# Patient Record
Sex: MALE | URBAN - METROPOLITAN AREA
[De-identification: names, ages, dates, MRNs, and addresses within clinical notes are randomized per-mention and may not be internally consistent; named-entity substitution may affect disease eponyms.]

---

## 2022-12-01 ENCOUNTER — OFFICE VISIT (OUTPATIENT)
Dept: URBAN - METROPOLITAN AREA SURGERY CENTER 31 | Facility: SURGERY CENTER | Age: 52
End: 2022-12-01

## 2022-12-12 ENCOUNTER — CLAIMS CREATED FROM THE CLAIM WINDOW (OUTPATIENT)
Dept: URBAN - METROPOLITAN AREA CLINIC 4 | Facility: CLINIC | Age: 52
End: 2022-12-12
Payer: COMMERCIAL

## 2022-12-12 ENCOUNTER — OFFICE VISIT (OUTPATIENT)
Dept: URBAN - METROPOLITAN AREA SURGERY CENTER 31 | Facility: SURGERY CENTER | Age: 52
End: 2022-12-12

## 2022-12-12 ENCOUNTER — CLAIMS CREATED FROM THE CLAIM WINDOW (OUTPATIENT)
Dept: URBAN - METROPOLITAN AREA SURGERY CENTER 31 | Facility: SURGERY CENTER | Age: 52
End: 2022-12-12
Payer: COMMERCIAL

## 2022-12-12 DIAGNOSIS — Z12.11 COLON CANCER SCREENING: ICD-10-CM

## 2022-12-12 DIAGNOSIS — K63.89 BACTERIAL OVERGROWTH SYNDROME: ICD-10-CM

## 2022-12-12 DIAGNOSIS — K62.3 RECTAL PROLAPSE: ICD-10-CM

## 2022-12-12 PROCEDURE — 45385 COLONOSCOPY W/LESION REMOVAL: CPT | Performed by: INTERNAL MEDICINE

## 2022-12-12 PROCEDURE — G8907 PT DOC NO EVENTS ON DISCHARG: HCPCS | Performed by: INTERNAL MEDICINE

## 2022-12-12 PROCEDURE — 88305 TISSUE EXAM BY PATHOLOGIST: CPT | Performed by: PATHOLOGY

## 2024-07-15 ENCOUNTER — OFFICE VISIT (OUTPATIENT)
Dept: URBAN - METROPOLITAN AREA CLINIC 128 | Facility: CLINIC | Age: 54
End: 2024-07-15
Payer: COMMERCIAL

## 2024-07-15 ENCOUNTER — DASHBOARD ENCOUNTERS (OUTPATIENT)
Age: 54
End: 2024-07-15

## 2024-07-15 VITALS
DIASTOLIC BLOOD PRESSURE: 72 MMHG | WEIGHT: 212.8 LBS | SYSTOLIC BLOOD PRESSURE: 124 MMHG | HEIGHT: 71 IN | HEART RATE: 90 BPM | BODY MASS INDEX: 29.79 KG/M2 | TEMPERATURE: 97.5 F

## 2024-07-15 DIAGNOSIS — K62.5 RECTAL BLEEDING: ICD-10-CM

## 2024-07-15 DIAGNOSIS — K64.8 EXTERNAL HEMORRHOIDS: ICD-10-CM

## 2024-07-15 PROCEDURE — 99204 OFFICE O/P NEW MOD 45 MIN: CPT | Performed by: PHYSICIAN ASSISTANT

## 2024-07-15 PROCEDURE — 99214 OFFICE O/P EST MOD 30 MIN: CPT | Performed by: PHYSICIAN ASSISTANT

## 2024-07-15 RX ORDER — HYDROCORTISONE 25 MG/G
1 APPLICATION CREAM TOPICAL TWICE A DAY
Qty: 1 TUBE | Refills: 0 | OUTPATIENT
Start: 2024-07-15 | End: 2024-08-14

## 2024-07-15 RX ORDER — LISINOPRIL 10 MG/1
1 TABLET TABLET ORAL ONCE A DAY
Status: ACTIVE | COMMUNITY

## 2024-07-15 RX ORDER — CELECOXIB 100 MG/1
1 CAPSULE WITH FOOD CAPSULE ORAL ONCE A DAY
Status: ACTIVE | COMMUNITY

## 2024-07-15 NOTE — PHYSICAL EXAM GASTROINTESTINAL
Abdomen , soft, nontender, nondistended , no guarding or rigidity , no masses palpable , normal bowel sounds , Liver and Spleen , no hepatomegaly present Rectal  , normal sphincter tone, no external hemorrhoids noted, a non-bleeding internal hemorrhoid was noted, no rectal masses or bleeding present. Rectal examination was performed with a chaperone present

## 2024-07-15 NOTE — HPI-OTHER HISTORIES
The patient presents today due to the following symptoms: bright red blood per rectum and constipation Duration of symptoms: x 2 weeks He denies melena Has 1 BM a day He went to [facility] ED on 7/10/24 for bright red blood per rectum in stool x 2 weeks. He had abdominal pain, wose after eating, no appetite and nausea with no vomiting. WBC 6.7, hgb/hct 15.9/46.3, platelets 198, normal LFTs. Had a colonoscopy in 2022 and a benign mucosal polyp was noted.   Abdominal and pelvic CT scan was negative for diverticulitis or other acute abnormality to explain his bleeding or pain. Patient denies a family history of colon polyps or colon cancer or stomach cancer  Patient denies any heart, lung, or kidney problems.  Patient denies any blood thinners or oxygen therapy. Denies any dialysis. Denies any pacemaker or defibrillator.

## 2024-07-19 ENCOUNTER — OFFICE VISIT (OUTPATIENT)
Dept: URBAN - METROPOLITAN AREA SURGERY CENTER 31 | Facility: SURGERY CENTER | Age: 54
End: 2024-07-19
Payer: COMMERCIAL

## 2024-07-19 ENCOUNTER — CLAIMS CREATED FROM THE CLAIM WINDOW (OUTPATIENT)
Dept: URBAN - METROPOLITAN AREA CLINIC 4 | Facility: CLINIC | Age: 54
End: 2024-07-19
Payer: COMMERCIAL

## 2024-07-19 DIAGNOSIS — K92.1 HEMATOCHEZIA: ICD-10-CM

## 2024-07-19 DIAGNOSIS — K51.818 OTHER ULCERATIVE COLITIS WITH OTHER COMPLICATION: ICD-10-CM

## 2024-07-19 DIAGNOSIS — K51.919 ULCERATIVE COLITIS, UNSPECIFIED WITH UNSPECIFIED COMPLICATIONS: ICD-10-CM

## 2024-07-19 DIAGNOSIS — K51.919 COLITIS, ULCERATIVE: ICD-10-CM

## 2024-07-19 PROCEDURE — 88305 TISSUE EXAM BY PATHOLOGIST: CPT | Performed by: PATHOLOGY

## 2024-07-19 PROCEDURE — 00811 ANES LWR INTST NDSC NOS: CPT | Performed by: NURSE ANESTHETIST, CERTIFIED REGISTERED

## 2024-07-19 PROCEDURE — 45380 COLONOSCOPY AND BIOPSY: CPT | Performed by: INTERNAL MEDICINE

## 2024-07-19 RX ORDER — MESALAMINE 4 G/60ML
PER RECTUM SUSPENSION RECTAL AT BEDTIME
Qty: 30 | Refills: 1 | OUTPATIENT
Start: 2024-07-19 | End: 2024-09-17

## 2024-07-19 RX ORDER — LISINOPRIL 10 MG/1
1 TABLET TABLET ORAL ONCE A DAY
Status: ACTIVE | COMMUNITY

## 2024-07-19 RX ORDER — CELECOXIB 100 MG/1
1 CAPSULE WITH FOOD CAPSULE ORAL ONCE A DAY
Status: ACTIVE | COMMUNITY

## 2024-07-19 RX ORDER — HYDROCORTISONE 25 MG/G
1 APPLICATION CREAM TOPICAL TWICE A DAY
Qty: 1 TUBE | Refills: 0 | Status: ACTIVE | COMMUNITY
Start: 2024-07-15 | End: 2024-08-14

## 2024-07-31 ENCOUNTER — WEB ENCOUNTER (OUTPATIENT)
Dept: URBAN - METROPOLITAN AREA CLINIC 128 | Facility: CLINIC | Age: 54
End: 2024-07-31

## 2024-08-14 ENCOUNTER — OFFICE VISIT (OUTPATIENT)
Dept: URBAN - METROPOLITAN AREA CLINIC 128 | Facility: CLINIC | Age: 54
End: 2024-08-14
Payer: COMMERCIAL

## 2024-08-14 VITALS
SYSTOLIC BLOOD PRESSURE: 124 MMHG | HEIGHT: 71 IN | TEMPERATURE: 97.5 F | DIASTOLIC BLOOD PRESSURE: 70 MMHG | WEIGHT: 211 LBS | HEART RATE: 80 BPM | BODY MASS INDEX: 29.54 KG/M2

## 2024-08-14 DIAGNOSIS — K51.80 OTHER ULCERATIVE COLITIS WITHOUT COMPLICATION: ICD-10-CM

## 2024-08-14 DIAGNOSIS — K62.5 RECTAL BLEEDING: ICD-10-CM

## 2024-08-14 PROBLEM — 64766004: Status: ACTIVE | Noted: 2024-08-14

## 2024-08-14 PROCEDURE — 99214 OFFICE O/P EST MOD 30 MIN: CPT | Performed by: PHYSICIAN ASSISTANT

## 2024-08-14 RX ORDER — CELECOXIB 100 MG/1
1 CAPSULE WITH FOOD CAPSULE ORAL ONCE A DAY
Status: ACTIVE | COMMUNITY

## 2024-08-14 RX ORDER — HYDROCORTISONE 25 MG/G
1 APPLICATION CREAM TOPICAL TWICE A DAY
Qty: 1 TUBE | Refills: 0 | Status: ACTIVE | COMMUNITY
Start: 2024-07-15 | End: 2024-08-14

## 2024-08-14 RX ORDER — BUDESONIDE 3 MG/1
3 CAPSULES CAPSULE, DELAYED RELEASE PELLETS ORAL ONCE A DAY
Qty: 168 CAPSULE | Refills: 0 | OUTPATIENT
Start: 2024-08-14

## 2024-08-14 RX ORDER — MESALAMINE 1.2 G/1
2 TABLETS WITH A MEAL TABLET, DELAYED RELEASE ORAL ONCE A DAY
Qty: 60 | Refills: 5 | OUTPATIENT
Start: 2024-08-14 | End: 2025-02-09

## 2024-08-14 RX ORDER — LISINOPRIL 10 MG/1
1 TABLET TABLET ORAL ONCE A DAY
Status: ACTIVE | COMMUNITY

## 2024-08-14 RX ORDER — MESALAMINE 4 G/60ML
PER RECTUM SUSPENSION RECTAL AT BEDTIME
Qty: 30 | Refills: 1 | Status: ACTIVE | COMMUNITY
Start: 2024-07-19 | End: 2024-09-17

## 2024-08-14 NOTE — HPI-OTHER HISTORIES
The patient presents today due to the following symptoms: bright red blood per rectum and rectal mucus which is persistent despite starting rowasa.  Duration of symptoms: x 2 weeks He denies melena Has 1 BM a day He went to [facility] ED on 7/10/24 for bright red blood per rectum in stool x 2 weeks. He had abdominal pain, wose after eating, no appetite and nausea with no vomiting. WBC 6.7, hgb/hct 15.9/46.3, platelets 198, normal LFTs. Had a colonoscopy in 2022 and a benign mucosal polyp was noted.   Abdominal and pelvic CT scan was negative for diverticulitis or other acute abnormality to explain his bleeding or pain. Patient denies a family history of colon polyps or colon cancer or stomach cancer  Patient denies any heart, lung, or kidney problems.  Patient denies any blood thinners or oxygen therapy. Denies any dialysis. Denies any pacemaker or defibrillator. 2024 colonoscopy revealed: Rectum, Biopsy:DIFFUSE CHRONIC ACTIVE COLITIS, CONSISTENT WITH ULCERATIVE COLITIS.  See            Comment.Negative for Infectious Organisms, Dysplasia or Malignancy

## 2024-08-14 NOTE — PHYSICAL EXAM GASTROINTESTINAL
Abdomen , soft, nontender, nondistended , no guarding or rigidity , no masses palpable , normal bowel sounds , Liver and Spleen , no hepatomegaly present Rectal  , deferred

## 2024-08-16 ENCOUNTER — TELEPHONE ENCOUNTER (OUTPATIENT)
Dept: URBAN - METROPOLITAN AREA CLINIC 128 | Facility: CLINIC | Age: 54
End: 2024-08-16

## 2024-08-30 LAB
A/G RATIO: 2.1
ALBUMIN: 4.4
ALKALINE PHOSPHATASE: 62
ALT (SGPT): 16
ANCA SCREEN: NEGATIVE
AST (SGOT): 15
BILIRUBIN, TOTAL: 1.2
BUN/CREATININE RATIO: (no result)
BUN: 16
C-REACTIVE PROTEIN, QUANT: <3
CALCIUM: 9.3
CARBON DIOXIDE, TOTAL: 21
CHLORIDE: 103
CREATININE: 1.05
EGFR: 85
FERRITIN, SERUM: 365
GASCA: 18.7
GLOBULIN, TOTAL: 2.1
GLUCOSE: 105
HEMATOCRIT: 50.6
HEMOGLOBIN: 16.6
IRON BIND.CAP.(TIBC): 352
IRON SATURATION: 31
IRON: 108
MCH: 31
MCHC: 32.8
MCV: 94.4
MITOGEN-NIL: 2.82
MPV: 10.3
MYELOPEROXIDASE ANTIBODY: <1
PLATELET COUNT: 212
POTASSIUM: 4
PROTEIN, TOTAL: 6.5
PROTEINASE-3 ANTIBODY: <1
QUANTIFERON NIL VALUE: 0.03
QUANTIFERON TB1 AG VALUE: 0
QUANTIFERON TB2 AG VALUE: 0
QUANTIFERON-TB GOLD PLUS: NEGATIVE
RDW: 12.3
RED BLOOD CELL COUNT: 5.36
SACCHAROMYCES CEREVISIAE AB (ASCA) (IGA): 5
SODIUM: 138
WHITE BLOOD CELL COUNT: 6.2

## 2024-10-21 ENCOUNTER — ERX REFILL RESPONSE (OUTPATIENT)
Dept: URBAN - METROPOLITAN AREA CLINIC 128 | Facility: CLINIC | Age: 54
End: 2024-10-21

## 2024-10-21 RX ORDER — BUDESONIDE 3 MG/1
TAKE THREE CAPSULES BY MOUTH ONE TIME DAILY CAPSULE, COATED PELLETS ORAL
Qty: 180 CAPSULE | Refills: 0 | OUTPATIENT

## 2024-10-21 RX ORDER — BUDESONIDE 3 MG/1
TAKE THREE CAPSULES BY MOUTH ONE TIME DAILY CAPSULE, COATED PELLETS ORAL
Qty: 180 CAPSULE | Refills: 1 | OUTPATIENT

## 2024-11-11 ENCOUNTER — OFFICE VISIT (OUTPATIENT)
Dept: URBAN - METROPOLITAN AREA CLINIC 128 | Facility: CLINIC | Age: 54
End: 2024-11-11
Payer: COMMERCIAL

## 2024-11-11 VITALS
SYSTOLIC BLOOD PRESSURE: 130 MMHG | TEMPERATURE: 97.3 F | DIASTOLIC BLOOD PRESSURE: 78 MMHG | BODY MASS INDEX: 29.79 KG/M2 | HEART RATE: 81 BPM | HEIGHT: 71 IN | WEIGHT: 212.8 LBS

## 2024-11-11 DIAGNOSIS — K62.5 RECTAL BLEEDING: ICD-10-CM

## 2024-11-11 DIAGNOSIS — K51.80 OTHER ULCERATIVE COLITIS WITHOUT COMPLICATION: ICD-10-CM

## 2024-11-11 PROCEDURE — 99213 OFFICE O/P EST LOW 20 MIN: CPT | Performed by: PHYSICIAN ASSISTANT

## 2024-11-11 RX ORDER — CELECOXIB 100 MG/1
1 CAPSULE WITH FOOD CAPSULE ORAL ONCE A DAY
Status: ACTIVE | COMMUNITY

## 2024-11-11 RX ORDER — BUDESONIDE 3 MG/1
3 CAPSULES CAPSULE, DELAYED RELEASE PELLETS ORAL ONCE A DAY
Qty: 168 CAPSULE | Refills: 0 | OUTPATIENT

## 2024-11-11 RX ORDER — MESALAMINE 1.2 G/1
2 TABLETS WITH A MEAL TABLET, DELAYED RELEASE ORAL ONCE A DAY
Qty: 60 | Refills: 5 | OUTPATIENT

## 2024-11-11 RX ORDER — MESALAMINE 1.2 G/1
2 TABLETS WITH A MEAL TABLET, DELAYED RELEASE ORAL ONCE A DAY
Qty: 60 | Refills: 5 | Status: ACTIVE | COMMUNITY
Start: 2024-08-14 | End: 2025-02-09

## 2024-11-11 RX ORDER — FENOFIBRATE 160 MG/1
1 TABLET TABLET ORAL ONCE A DAY
Status: ACTIVE | COMMUNITY

## 2024-11-11 RX ORDER — POLYETHYLENE GLYCOL 3350, SODIUM SULFATE ANHYDROUS, SODIUM BICARBONATE, SODIUM CHLORIDE, POTASSIUM CHLORIDE 236; 22.74; 6.74; 5.86; 2.97 G/4L; G/4L; G/4L; G/4L; G/4L
AS DIRECTED POWDER, FOR SOLUTION ORAL ONCE A DAY
Qty: 1 BOTTLE | Refills: 0 | OUTPATIENT
Start: 2024-11-11 | End: 2024-11-12

## 2024-11-11 RX ORDER — BUDESONIDE 3 MG/1
TAKE THREE CAPSULES BY MOUTH ONE TIME DAILY CAPSULE, COATED PELLETS ORAL
Qty: 180 CAPSULE | Refills: 0 | Status: ACTIVE | COMMUNITY

## 2024-11-11 RX ORDER — LISINOPRIL 10 MG/1
1 TABLET TABLET ORAL ONCE A DAY
Status: ACTIVE | COMMUNITY

## 2024-11-11 NOTE — HPI-OTHER HISTORIES
The patient presents today due to the following symptoms: bright red blood per rectum and rectal mucus have resolved now on lialda. He took budesonide for flare but if off of it now.  Duration of symptoms: x 2 weeks He denies melena Has 1 BM a day He went to [facility] ED on 7/10/24 for bright red blood per rectum in stool x 2 weeks. He had abdominal pain, wose after eating, no appetite and nausea with no vomiting. WBC 6.7, hgb/hct 15.9/46.3, platelets 198, normal LFTs. Had a colonoscopy in 2022 and a benign mucosal polyp was noted.   Abdominal and pelvic CT scan was negative for diverticulitis or other acute abnormality to explain his bleeding or pain. Patient denies a family history of colon polyps or colon cancer or stomach cancer  Patient denies any heart, lung, or kidney problems.  Patient denies any blood thinners or oxygen therapy. Denies any dialysis. Denies any pacemaker or defibrillator. 2024 colonoscopy revealed: Rectum, Biopsy:DIFFUSE CHRONIC ACTIVE COLITIS, CONSISTENT WITH ULCERATIVE COLITIS.  See            Comment.Negative for Infectious Organisms, Dysplasia or Malignancy

## 2024-11-12 ENCOUNTER — TELEPHONE ENCOUNTER (OUTPATIENT)
Dept: URBAN - METROPOLITAN AREA CLINIC 128 | Facility: CLINIC | Age: 54
End: 2024-11-12

## 2025-01-06 ENCOUNTER — OFFICE VISIT (OUTPATIENT)
Dept: URBAN - METROPOLITAN AREA SURGERY CENTER 31 | Facility: SURGERY CENTER | Age: 55
End: 2025-01-06

## 2025-02-03 ENCOUNTER — OFFICE VISIT (OUTPATIENT)
Dept: URBAN - METROPOLITAN AREA CLINIC 128 | Facility: CLINIC | Age: 55
End: 2025-02-03

## 2025-02-04 ENCOUNTER — WEB ENCOUNTER (OUTPATIENT)
Dept: URBAN - METROPOLITAN AREA CLINIC 128 | Facility: CLINIC | Age: 55
End: 2025-02-04

## 2025-02-06 ENCOUNTER — CLAIMS CREATED FROM THE CLAIM WINDOW (OUTPATIENT)
Dept: URBAN - METROPOLITAN AREA CLINIC 4 | Facility: CLINIC | Age: 55
End: 2025-02-06
Payer: COMMERCIAL

## 2025-02-06 ENCOUNTER — OFFICE VISIT (OUTPATIENT)
Dept: URBAN - METROPOLITAN AREA SURGERY CENTER 31 | Facility: SURGERY CENTER | Age: 55
End: 2025-02-06
Payer: COMMERCIAL

## 2025-02-06 DIAGNOSIS — K63.5 POLYP OF COLON: ICD-10-CM

## 2025-02-06 DIAGNOSIS — D12.3 BENIGN NEOPLASM OF TRANSVERSE COLON: ICD-10-CM

## 2025-02-06 DIAGNOSIS — K51.20 ULCERATIVE PROCTITIS WITHOUT COMPLICATION: ICD-10-CM

## 2025-02-06 DIAGNOSIS — K51.80 OTHER ULCERATIVE COLITIS WITHOUT COMPLICATIONS: ICD-10-CM

## 2025-02-06 DIAGNOSIS — K63.89 OTHER SPECIFIED DISEASES OF INTESTINE: ICD-10-CM

## 2025-02-06 DIAGNOSIS — K63.5 BENIGN COLON POLYP: ICD-10-CM

## 2025-02-06 DIAGNOSIS — D12.3 ADENOMA OF TRANSVERSE COLON: ICD-10-CM

## 2025-02-06 PROCEDURE — 00811 ANES LWR INTST NDSC NOS: CPT | Performed by: NURSE ANESTHETIST, CERTIFIED REGISTERED

## 2025-02-06 PROCEDURE — 45380 COLONOSCOPY AND BIOPSY: CPT | Performed by: INTERNAL MEDICINE

## 2025-02-06 PROCEDURE — 88305 TISSUE EXAM BY PATHOLOGIST: CPT | Performed by: PATHOLOGY

## 2025-02-06 PROCEDURE — 88300 SURGICAL PATH GROSS: CPT | Performed by: PATHOLOGY

## 2025-02-06 RX ORDER — LISINOPRIL 10 MG/1
1 TABLET TABLET ORAL ONCE A DAY
Status: ACTIVE | COMMUNITY

## 2025-02-06 RX ORDER — CELECOXIB 100 MG/1
1 CAPSULE WITH FOOD CAPSULE ORAL ONCE A DAY
Status: ACTIVE | COMMUNITY

## 2025-02-06 RX ORDER — BUDESONIDE 3 MG/1
3 CAPSULES CAPSULE, DELAYED RELEASE PELLETS ORAL ONCE A DAY
Qty: 168 CAPSULE | Refills: 0 | Status: ACTIVE | COMMUNITY

## 2025-02-06 RX ORDER — FENOFIBRATE 160 MG/1
1 TABLET TABLET ORAL ONCE A DAY
Status: ACTIVE | COMMUNITY

## 2025-02-06 RX ORDER — BUDESONIDE 3 MG/1
TAKE THREE CAPSULES BY MOUTH ONE TIME DAILY CAPSULE, COATED PELLETS ORAL
Qty: 180 CAPSULE | Refills: 0 | Status: ACTIVE | COMMUNITY

## 2025-02-06 RX ORDER — MESALAMINE 1.2 G/1
2 TABLETS WITH A MEAL TABLET, DELAYED RELEASE ORAL ONCE A DAY
Qty: 60 | Refills: 5 | Status: ACTIVE | COMMUNITY

## 2025-02-24 ENCOUNTER — OFFICE VISIT (OUTPATIENT)
Dept: URBAN - METROPOLITAN AREA CLINIC 128 | Facility: CLINIC | Age: 55
End: 2025-02-24
Payer: COMMERCIAL

## 2025-02-24 VITALS
DIASTOLIC BLOOD PRESSURE: 74 MMHG | WEIGHT: 221 LBS | SYSTOLIC BLOOD PRESSURE: 124 MMHG | TEMPERATURE: 97.5 F | HEART RATE: 84 BPM | BODY MASS INDEX: 30.94 KG/M2 | HEIGHT: 71 IN

## 2025-02-24 DIAGNOSIS — Z86.0100 PERSONAL HISTORY OF COLONIC POLYPS: ICD-10-CM

## 2025-02-24 DIAGNOSIS — K62.5 RECTAL BLEEDING: ICD-10-CM

## 2025-02-24 DIAGNOSIS — K51.80 OTHER ULCERATIVE COLITIS WITHOUT COMPLICATION: ICD-10-CM

## 2025-02-24 PROCEDURE — 99213 OFFICE O/P EST LOW 20 MIN: CPT | Performed by: PHYSICIAN ASSISTANT

## 2025-02-24 RX ORDER — BUDESONIDE 3 MG/1
3 CAPSULES CAPSULE, DELAYED RELEASE PELLETS ORAL ONCE A DAY
Qty: 168 CAPSULE | Refills: 0 | OUTPATIENT

## 2025-02-24 RX ORDER — LISINOPRIL 10 MG/1
1 TABLET TABLET ORAL ONCE A DAY
Status: ACTIVE | COMMUNITY

## 2025-02-24 RX ORDER — BUDESONIDE 3 MG/1
TAKE THREE CAPSULES BY MOUTH ONE TIME DAILY CAPSULE, COATED PELLETS ORAL
Qty: 180 CAPSULE | Refills: 0 | Status: ACTIVE | COMMUNITY

## 2025-02-24 RX ORDER — MESALAMINE 1.2 G/1
2 TABLETS WITH A MEAL TABLET, DELAYED RELEASE ORAL ONCE A DAY
Qty: 60 | Refills: 5 | Status: ACTIVE | COMMUNITY

## 2025-02-24 RX ORDER — MESALAMINE 1.2 G/1
2 TABLETS WITH A MEAL TABLET, DELAYED RELEASE ORAL ONCE A DAY
Qty: 60 | Refills: 5 | OUTPATIENT

## 2025-02-24 RX ORDER — FENOFIBRATE 160 MG/1
1 TABLET TABLET ORAL ONCE A DAY
Status: ACTIVE | COMMUNITY

## 2025-02-24 RX ORDER — CELECOXIB 100 MG/1
1 CAPSULE WITH FOOD CAPSULE ORAL ONCE A DAY
Status: ACTIVE | COMMUNITY

## 2025-02-24 NOTE — HPI-OTHER HISTORIES
The patient presents today due to the following symptoms: bright red blood per rectum and rectal mucus have resolved now on lialda. He took budesonide for flare but if off of it now.  Duration of symptoms: x 2 weeks He denies melena Has 1 BM a day He went to [facility] ED on 7/10/24 for bright red blood per rectum in stool x 2 weeks. He had abdominal pain, wose after eating, no appetite and nausea with no vomiting. WBC 6.7, hgb/hct 15.9/46.3, platelets 198, normal LFTs. Had a colonoscopy in 2022 and a benign mucosal polyp was noted.   Abdominal and pelvic CT scan was negative for diverticulitis or other acute abnormality to explain his bleeding or pain. Patient denies a family history of colon polyps or colon cancer or stomach cancer  Patient denies any heart, lung, or kidney problems.  Patient denies any blood thinners or oxygen therapy. Denies any dialysis. Denies any pacemaker or defibrillator. 2024 colonoscopy revealed: Rectum, Biopsy:DIFFUSE CHRONIC ACTIVE COLITIS, CONSISTENT WITH ULCERATIVE COLITIS.  See            Comment.Negative for Infectious Organisms, Dysplasia or Malignancy  2/2025 colonoscopy revealed: (A) Colon, Ascending, Biopsy (Cold Forceps): NO SIGNIFICANT ABNORMALITY. (B) Colon, Transverse, Biopsy (Cold Forceps): NO SIGNIFICANT ABNORMALITY. (C) Colon, Descending, Biopsy (Cold Forceps): NO SIGNIFICANT ABNORMALITY. (D) Colon, Sigmoid, Biopsy (Cold Forceps): NO SIGNIFICANT ABNORMALITY. (E) Colon, Sigmoid, Polypectomy (Cold Forceps): HYPERPLASTIC POLYP(S). (F) Colon, Transverse, Biopsy (Cold Forceps): NO SIGNIFICANT ABNORMALITY. (G) Rectum, Biopsy (Cold Forceps): NO TISSUE IS IDENTIFIED ON OR IN THE CONTAINER, FILTERED FIXATIVE, OR SECTIONED BLOCK. (H) Rectum, Biopsy (Cold Forceps): PATCHY MILD CHRONIC INACTIVE COLITIS, CONSISTENT WITH QUIESCENT ULCERATIVE COLITIS. See Comment. Negative for Infectious Organisms, Dysplasia or Malignancy. COMMENT: The biopsies show patchy chronic inactive colitis with a universal involvement of the rectum and absence of granulomas, supporting a diagnosis of quiescent ulcerative colitis. The presence of histological features of chronicity excludes infectious colitis. (I) Colon, Transverse, Polypectomy (Cold Forceps): TUBULAR ADENOMA(S). He denies having any abdominal pain, diarrhea, rectal mucus or bleeding

## 2025-02-25 ENCOUNTER — TELEPHONE ENCOUNTER (OUTPATIENT)
Dept: URBAN - METROPOLITAN AREA CLINIC 128 | Facility: CLINIC | Age: 55
End: 2025-02-25

## 2025-08-17 ENCOUNTER — ERX REFILL RESPONSE (OUTPATIENT)
Dept: URBAN - METROPOLITAN AREA CLINIC 128 | Facility: CLINIC | Age: 55
End: 2025-08-17

## 2025-08-17 RX ORDER — BUDESONIDE 3 MG/1
TAKE THREE CAPSULES BY MOUTH ONE TIME DAILY CAPSULE, COATED PELLETS ORAL
Qty: 168 CAPSULE | Refills: 0 | OUTPATIENT